# Patient Record
Sex: MALE | Race: WHITE | Employment: FULL TIME | URBAN - METROPOLITAN AREA
[De-identification: names, ages, dates, MRNs, and addresses within clinical notes are randomized per-mention and may not be internally consistent; named-entity substitution may affect disease eponyms.]

---

## 2021-01-05 NOTE — TELEPHONE ENCOUNTER
REFERRAL INFORMATION:    Referring Provider:  N/A    Referring Clinic:  N/A    Reason for Visit/Diagnosis: GERD, Abdominal Pain      FUTURE VISIT INFORMATION:    Appointment Date: 1/7/2021    Appointment Time: 2 PM      NOTES STATUS DETAILS   OFFICE NOTE from Referring Provider N/A    OFFICE NOTE from Other Specialist In process    HOSPITAL DISCHARGE SUMMARY/  ED VISITS N/A    OPERATIVE REPORT N/A    MEDICATION LIST N/A         ENDOSCOPY  In process    COLONOSCOPY In process    ERCP N/A    EUS N/A    STOOL TESTING In process    PERTINENT LABS In process    PATHOLOGY REPORTS (RELATED) In process    IMAGING (CT, MRI, EGD, MRCP, Small Bowel Follow Through/SBT, MR/CT Enterography) In process      1/5/2021 10:04am Called and LVM for Pt. Brendon     1/6/2021 3:20pm Called Pt and LVM; called to inquire about outside medical records.  CSS will notify MD. Olivas

## 2021-01-07 ENCOUNTER — VIRTUAL VISIT (OUTPATIENT)
Dept: GASTROENTEROLOGY | Facility: CLINIC | Age: 31
End: 2021-01-07
Payer: COMMERCIAL

## 2021-01-07 ENCOUNTER — PRE VISIT (OUTPATIENT)
Dept: GASTROENTEROLOGY | Facility: CLINIC | Age: 31
End: 2021-01-07

## 2021-01-07 VITALS — WEIGHT: 182 LBS | BODY MASS INDEX: 27.58 KG/M2 | HEIGHT: 68 IN

## 2021-01-07 DIAGNOSIS — K52.9 FREQUENT STOOLS: ICD-10-CM

## 2021-01-07 DIAGNOSIS — R14.0 BLOATING: ICD-10-CM

## 2021-01-07 DIAGNOSIS — R19.8 ABDOMINAL BURNING SENSATION IN LEFT UPPER QUADRANT: Primary | ICD-10-CM

## 2021-01-07 DIAGNOSIS — K62.5 RECTAL BLEEDING: ICD-10-CM

## 2021-01-07 DIAGNOSIS — R09.A2 GLOBUS SENSATION: ICD-10-CM

## 2021-01-07 PROCEDURE — 99204 OFFICE O/P NEW MOD 45 MIN: CPT | Mod: GT | Performed by: INTERNAL MEDICINE

## 2021-01-07 ASSESSMENT — PAIN SCALES - GENERAL: PAINLEVEL: NO PAIN (0)

## 2021-01-07 ASSESSMENT — MIFFLIN-ST. JEOR: SCORE: 1755.05

## 2021-01-07 NOTE — PATIENT INSTRUCTIONS
It was a pleasure taking care of you today.  I've included a brief summary of our discussion and care plan from today's visit below.  Please review this information with your primary care provider.  _______________________________________________________________________    My recommendations are summarized as follows:    1. Please schedule upper endoscopy with the placement of a 96hr Bravo capsule OFF PPI therapy.   2. At the same time we will perform colonoscopy to assess the rectal bleeding.  3. Depending on the findings we may consider PPI therapy.     To schedule endoscopic procedures you may call: [phone]  To schedule radiology tests you may call: [phone]  To schedule an ENT appointment you may call: [phone]    Please call our nurse Kacey with any questions or concerns- [phone].  --    Return to GI Clinic in 3 months to review your progress.    _______________________________________________________________________    Who do I call with any questions after my visit?  Please be in touch if there are any further questions that arise following today's visit.  There are multiple ways to contact your gastroenterology care team.        During business hours, you may reach a Gastroenterology nurse at [phone] and choose option 3.         To schedule or reschedule an appointment, please call [phone].       You can always send a secure message through VideoLens.  VideoLens messages are answered by your nurse or doctor typically within 24 hours.  Please allow extra time on weekends and holidays.        For urgent/emergent questions after business hours, you may reach the on-call GI Fellow by contacting the [facility] at [phone].     How will I get the results of any tests ordered?    You will receive all of your results.  If you have signed up for MyChart, any tests ordered at your visit will be available to you after your physician reviews them.  Typically this takes 1-2  weeks.  If there are urgent results that require a change in your care plan, your physician or nurse will call you to discuss the next steps.      What is Coupayhart?  theScore is a secure way for you to access all of your healthcare records from the [facility].  It is a web based computer program, so you can sign on to it from any location.  It also allows you to send secure messages to your care team.  I recommend signing up for theScore access if you have not already done so and are comfortable with using a computer.      How to I schedule a follow-up visit?  If you did not schedule a follow-up visit today, please call [phone] to schedule a follow-up office visit.        Sincerely,    Rubin Blakely DO   of Medicine  Division of Gastroenterology, Hepatology, and Nutrition  [facility]

## 2021-01-07 NOTE — NURSING NOTE
"Chief Complaint   Patient presents with     New Patient     GERD, abdominal pain       Vitals:    01/07/21 1330   Weight: 82.6 kg (182 lb)   Height: 1.727 m (5' 8\")       Body mass index is 27.67 kg/m .      Remigio Morin LPN                        "

## 2021-01-07 NOTE — LETTER
"    1/7/2021         RE: Bonilla Baker  [address] [place] [address]        Dear Colleague,    Thank you for referring your patient, Bonilla Baker, to the [facility] [place]. Please see a copy of my visit note below.    Bonilla is a 30 year old who is being evaluated via a billable video visit.      How would you like to obtain your AVS? MyChart  If the video visit is dropped, the invitation should be resent by: Text to cell phone: [phone]  Will anyone else be joining your video visit? No      Bonilla Baker is a 30 year old male who is being evaluated via a billable video visit.      The patient has been notified of following:     \"This video visit will be conducted via a call between you and your physician/provider. We have found that certain health care needs can be provided without the need for an in-person physical exam.  This service lets us provide the care you need with a video conversation.  If a prescription is necessary we can send it directly to your pharmacy.  If lab work is needed we can place an order for that and you can then stop by our lab to have the test done at a later time.    If during the course of the call the physician/provider feels a video visit is not appropriate, you will not be charged for this service.\"     Video-Visit Details  Type of service:  Video Visit    Video Start Time: 2:03 PM  Video End Time:  2:30 PM    Originating Location (pt. Location): Home    Distant Location (provider location):  [facility] [place]     Platform used: Gareth Blakely DO    Gastroenterology Visit for: Bonilla Baker 1990   MRN: [id number]     Reason for Visit:  chief complaint    Referred by: Self  / No address on file  No care team member to display    History of Present Illness:   Bonilla Baker is a 30 year old male with heart murmur who is presenting as a new patient in consultation at the " request as a self referral with a chief complaint of abdominal burning and rectal bleeding..  ---------------------------------------------------------------  Bonilla Baker states three years ago he began having symptoms of acid reflux. In the morning depending on what he would eat the night before he would feels, he had LUQ burning, esophageal burning, side pain, occasionally has blood in his stool. This has been more frequent over the past few months. He had a chronic symptom of globus, he stopped drinking coffee which improved globus.  Occasional symptoms of heartburn. The LUQ burning is the most predominant symptom.     Drinking water, pizza, red sauce cause worse symptoms.     Reports having an upper endoscopy with no significant finding. Has not been on protonix, aciphex, dexilant. Has been on PPIs for greater than 2 months at a time, taking them consistently both once daily and BID.     Patient reports being gassy and has 5-6 bowel movements a day. + bloating. Frequency of bowel movements is baseline for as long as he can remember. Stools are typically loose in the morning and by noon he has more solid stools.  Bloating improves with bowel movements and exercise.     Blood in stool is described as spotty. Toilet paper can be bloody but has noted on the stool as well. Blood described as red.     ---------------------------------------------------------------     Bonilla Baker denies dysphagia, odynophagia,nausea, vomiting, early satiety, abdominal distension, constipation, hematochezia, or melena. No unintentional weight loss.     Family history of colon cancer: none    No family history of IBD  Wt Readings from Last 5 Encounters:   01/07/21 82.6 kg (182 lb)        Esophageal Questionnaire(s)    BEDQ Questionnaire  No flowsheet data found.  No flowsheet data found.    Eckardt Questionnaire  No flowsheet data found.    Promis 10 Questionnaire  No flowsheet data found.        STUDIES & PROCEDURES:    EGD:    Date: reported as normal  Impression:  Pathology Report:    Colonoscopy:  Date:  Impression:    Pathology Report:     EndoFLIP directed at the UES or LES (8cm (EF-325) balloon length or 16cm (EF-322) balloon length):   Date:  8cm balloon  Balloon inflation Balloon pressure CSA (mm^2) DI (mm^2/mmHg) Dmin (mm) Compliance   20 (ladmark ID)        30        40        50           16cm balloon  Balloon inflation Balloon pressure CSA (mm^2) DI (mm^2/mmHg) Dmin (mm) Compliance   30 (ladmark ID)        40        50        60        70           High Resolution Manometry:  Date:  Impression:    PH/Impedance:  Date:  Impression:     Bravo:  48 or 96hr  Date:  Impression:    CT:  Date:  Impression:    Esophagram:  Date:  Impression:     Prior medical records were reviewed including, but not limited to, notes from referring providers, lab work, radiographic tests, and other diagnostic tests. Pertinent results were summarized above.     History     Past Medical History:   Diagnosis Date     Heart murmur        History reviewed. No pertinent surgical history.    Social History     Socioeconomic History     Marital status: Single     Spouse name: Not on file     Number of children: Not on file     Years of education: Not on file     Highest education level: Not on file   Occupational History     Not on file   Social Needs     Financial resource strain: Not on file     Food insecurity     Worry: Not on file     Inability: Not on file     Transportation needs     Medical: Not on file     Non-medical: Not on file   Tobacco Use     Smoking status: Never Smoker     Smokeless tobacco: Never Used   Substance and Sexual Activity     Alcohol use: Not on file     Drug use: Not on file     Sexual activity: Not on file   Lifestyle     Physical activity     Days per week: Not on file     Minutes per session: Not on file     Stress: Not on file   Relationships     Social connections     Talks on phone: Not on file     Gets together: Not on  "file     Attends L[employer]-day service: Not on file     Active member of club or organization: Not on file     Attends meetings of clubs or organizations: Not on file     Relationship status: Not on file     Intimate partner violence     Fear of current or ex partner: Not on file     Emotionally abused: Not on file     Physically abused: Not on file     Forced sexual activity: Not on file   Other Topics Concern     Parent/sibling w/ CABG, MI or angioplasty before 65F 55M? Not Asked   Social History Narrative     Not on file       Family History   Problem Relation Age of Onset     Crohn's Disease No family hx of      Ulcerative Colitis No family hx of      GERD No family hx of      Stomach Cancer No family hx of        Medications and Allergies:     No outpatient encounter medications on file as of 1/7/2021.     No facility-administered encounter medications on file as of 1/7/2021.         No Known Allergies     Review of systems:  A full 10 point review of systems was obtained and was negative except for the pertinent positives and negatives stated within the HPI.    Objective Findings:   Physical Exam:    Constitutional: Ht 1.727 m (5' 8\")   Wt 82.6 kg (182 lb)   BMI 27.67 kg/m    General: Alert, cooperative, no distress, well-appearing  Head: Atraumatic, normocephalic, no obvious abnormalities   Eyes: EOMI, Sclera anicteric, no obvious conjunctival hemorrhage   Nose: Nares normal, no obvious malformation, no obvious rhinorrhea   Throat: Uvula midline, no obvious tongue deviation, palate elevation equal bilaterally  Respiratory: normal appearing respirations  Musculoskeletal: Range of motion intact, no obvious strength deficit  Skin: No jaundice, no obvious rash  Neurologic: AAOx3, no obvious neurologic abnormality  Psychiatric: Normal Affect, appropriate mood  Extremities: No obvious edema, no obvious malformation     Labs, Radiology, Pathology     No results found for: WBC, HGB, PLT, CHOL, TRIG, HDL, ALT, AST, NA, " CREATININE, BUN, CO2, TSH, INR, GLUF     Liver Function Studies - No results for input(s): PROTTOTAL, ALBUMIN, BILITOTAL, ALKPHOS, AST, ALT, BILIDIRECT in the last 89277 hours.       Patient Active Problem List    Diagnosis Date Noted     Abdominal burning sensation in left upper quadrant 01/07/2021     Priority: Medium     Globus sensation 01/07/2021     Priority: Medium     Rectal bleeding 01/07/2021     Priority: Medium     Bloating 01/07/2021     Priority: Medium     Frequent stools 01/07/2021     Priority: Medium      Assessment and Plan   Assessment:    Bonilla Baker is a 30 year old male with heart murmur who is presenting as a new patient in consultation at the request as a self referral with a chief complaint of abdominal burning and rectal bleeding.    The patient was seen in telemedicine video consultation regarding his symptoms of abdominal burning, bloating, and rectal bleeding.     The patient's predominant symptom of abdominal burning may be secondary to acid reflux disease but this is not yet clear. Additionally, he has been on multiple PPIs for prolonged periods of time both as once daily and BID dosing with no change in symptoms. The fact that the patient's globus improved with cessation of caffeine does suggest some degree of reflux disease. The most prudent way to evaluate his symptoms would be to perform upper endoscopy and place a 96hr OFF therapy bravo. We discussed the risks and benefits of pursuing upper endoscopy. The benefits outweigh the potential risks however the risks, including but not limited to: bleeding, infection, perforation, the need for surgical intervention, hospitalization, and in rare instances death were discussed and all questions were answered. Lynn was also discussed including risks and benefits. The patient denies any nickel allergy.     Gastric and duodenal biopsies will be obtained at that time. The duodenal biopsies will serve to evaluate the frequent loose stools  and bloating.     The patient's rectal bleeding and loose stools will require a colonoscopy to evaluate. We discussed the risks and benefits of pursuing colonoscopy The benefits outweigh the potential risks however the risks, including but not limited to: bleeding, infection, perforation, the need for surgical intervention, hospitalization, and in rare instances death were discussed and all questions were answered.    1. Abdominal burning sensation in left upper quadrant    2. Bloating    3. Globus sensation    4. Frequent stools    5. Rectal bleeding       Orders Placed This Encounter   Procedures     GASTROENTEROLOGY ADULT REF PROCEDURE ONLY      Plan:  1. Please schedule upper endoscopy with the placement of a 96hr Bravo capsule OFF PPI therapy.   2. At the same time we will perform colonoscopy to assess the rectal bleeding.  3. Depending on the findings we may consider PPI therapy.     Follow up plan:   Return to clinic 3 months and as needed.    The risks and benefits of my recommendations, as well as other treatment options were discussed with the patient and any available family today. All questions were answered.     o Follow up: As planned above. Today, I personally spent 27 minutes in direct face to face time with the patient, of which greater than 50% of the time was spent in patient education and counseling as described above. Approximately 15 minutes were spent on indirect care associated with the patient's consultation including but not limited to review of: patient medical records to date, clinic visits, hospital records, lab results, imaging studies, procedural documentation, and coordinating care with other providers. The findings from this review are summarized in the above note.    The patient verbalized understanding of the plan and was appreciative for the time spent and information provided during the office visit.     Author:   Rubin Blakely DO   of Medicine  Division of  Gastroenterology, Hepatology, and Nutrition  [facility]     Documentation assisted by voice recognition and documentation system.          Again, thank you for allowing me to participate in the care of your patient.        Sincerely,        Rubin Blakely, DO

## 2021-01-07 NOTE — PROGRESS NOTES
"Bonilla is a 30 year old who is being evaluated via a billable video visit.      How would you like to obtain your AVS? MyChart  If the video visit is dropped, the invitation should be resent by: Text to cell phone: [phone]  Will anyone else be joining your video visit? No      Bonilla Baker is a 30 year old male who is being evaluated via a billable video visit.      The patient has been notified of following:     \"This video visit will be conducted via a call between you and your physician/provider. We have found that certain health care needs can be provided without the need for an in-person physical exam.  This service lets us provide the care you need with a video conversation.  If a prescription is necessary we can send it directly to your pharmacy.  If lab work is needed we can place an order for that and you can then stop by our lab to have the test done at a later time.    If during the course of the call the physician/provider feels a video visit is not appropriate, you will not be charged for this service.\"     Video-Visit Details  Type of service:  Video Visit    Video Start Time: 2:03 PM  Video End Time:  2:30 PM    Originating Location (pt. Location): Home    Distant Location (provider location):  [facility] [place]     Platform used: Gareth Blakely DO    Gastroenterology Visit for: Bonilla Baker 1990   MRN: [id number]     Reason for Visit:  chief complaint    Referred by: Self  / No address on file  No care team member to display    History of Present Illness:   Bonilla Baker is a 30 year old male with heart murmur who is presenting as a new patient in consultation at the request as a self referral with a chief complaint of abdominal burning and rectal bleeding..  ---------------------------------------------------------------  Bonilla Baker states three years ago he began having symptoms of acid reflux. In the morning depending on what he " would eat the night before he would feels, he had LUQ burning, esophageal burning, side pain, occasionally has blood in his stool. This has been more frequent over the past few months. He had a chronic symptom of globus, he stopped drinking coffee which improved globus.  Occasional symptoms of heartburn. The LUQ burning is the most predominant symptom.     Drinking water, pizza, red sauce cause worse symptoms.     Reports having an upper endoscopy with no significant finding. Has not been on protonix, aciphex, dexilant. Has been on PPIs for greater than 2 months at a time, taking them consistently both once daily and BID.     Patient reports being gassy and has 5-6 bowel movements a day. + bloating. Frequency of bowel movements is baseline for as long as he can remember. Stools are typically loose in the morning and by noon he has more solid stools.  Bloating improves with bowel movements and exercise.     Blood in stool is described as spotty. Toilet paper can be bloody but has noted on the stool as well. Blood described as red.     ---------------------------------------------------------------     Bonilla Baker denies dysphagia, odynophagia,nausea, vomiting, early satiety, abdominal distension, constipation, hematochezia, or melena. No unintentional weight loss.     Family history of colon cancer: none    No family history of IBD  Wt Readings from Last 5 Encounters:   01/07/21 82.6 kg (182 lb)        Esophageal Questionnaire(s)    BEDQ Questionnaire  No flowsheet data found.  No flowsheet data found.    Eckardt Questionnaire  No flowsheet data found.    Promis 10 Questionnaire  No flowsheet data found.        STUDIES & PROCEDURES:    EGD:   Date: reported as normal  Impression:  Pathology Report:    Colonoscopy:  Date:  Impression:    Pathology Report:     EndoFLIP directed at the UES or LES (8cm (EF-325) balloon length or 16cm (EF-322) balloon length):   Date:  8cm balloon  Balloon inflation Balloon pressure  CSA (mm^2) DI (mm^2/mmHg) Dmin (mm) Compliance   20 (ladmark ID)        30        40        50           16cm balloon  Balloon inflation Balloon pressure CSA (mm^2) DI (mm^2/mmHg) Dmin (mm) Compliance   30 (ladmark ID)        40        50        60        70           High Resolution Manometry:  Date:  Impression:    PH/Impedance:  Date:  Impression:     Bravo:  48 or 96hr  Date:  Impression:    CT:  Date:  Impression:    Esophagram:  Date:  Impression:     Prior medical records were reviewed including, but not limited to, notes from referring providers, lab work, radiographic tests, and other diagnostic tests. Pertinent results were summarized above.     History     Past Medical History:   Diagnosis Date     Heart murmur        History reviewed. No pertinent surgical history.    Social History     Socioeconomic History     Marital status: Single     Spouse name: Not on file     Number of children: Not on file     Years of education: Not on file     Highest education level: Not on file   Occupational History     Not on file   Social Needs     Financial resource strain: Not on file     Food insecurity     Worry: Not on file     Inability: Not on file     Transportation needs     Medical: Not on file     Non-medical: Not on file   Tobacco Use     Smoking status: Never Smoker     Smokeless tobacco: Never Used   Substance and Sexual Activity     Alcohol use: Not on file     Drug use: Not on file     Sexual activity: Not on file   Lifestyle     Physical activity     Days per week: Not on file     Minutes per session: Not on file     Stress: Not on file   Relationships     Social connections     Talks on phone: Not on file     Gets together: Not on file     Attends [employer] service: Not on file     Active member of club or organization: Not on file     Attends meetings of clubs or organizations: Not on file     Relationship status: Not on file     Intimate partner violence     Fear of current or ex partner: Not on file  "    Emotionally abused: Not on file     Physically abused: Not on file     Forced sexual activity: Not on file   Other Topics Concern     Parent/sibling w/ CABG, MI or angioplasty before 65F 55M? Not Asked   Social History Narrative     Not on file       Family History   Problem Relation Age of Onset     Crohn's Disease No family hx of      Ulcerative Colitis No family hx of      GERD No family hx of      Stomach Cancer No family hx of        Medications and Allergies:     No outpatient encounter medications on file as of 1/7/2021.     No facility-administered encounter medications on file as of 1/7/2021.         No Known Allergies     Review of systems:  A full 10 point review of systems was obtained and was negative except for the pertinent positives and negatives stated within the HPI.    Objective Findings:   Physical Exam:    Constitutional: Ht 1.727 m (5' 8\")   Wt 82.6 kg (182 lb)   BMI 27.67 kg/m    General: Alert, cooperative, no distress, well-appearing  Head: Atraumatic, normocephalic, no obvious abnormalities   Eyes: EOMI, Sclera anicteric, no obvious conjunctival hemorrhage   Nose: Nares normal, no obvious malformation, no obvious rhinorrhea   Throat: Uvula midline, no obvious tongue deviation, palate elevation equal bilaterally  Respiratory: normal appearing respirations  Musculoskeletal: Range of motion intact, no obvious strength deficit  Skin: No jaundice, no obvious rash  Neurologic: AAOx3, no obvious neurologic abnormality  Psychiatric: Normal Affect, appropriate mood  Extremities: No obvious edema, no obvious malformation     Labs, Radiology, Pathology     No results found for: WBC, HGB, PLT, CHOL, TRIG, HDL, ALT, AST, NA, CREATININE, BUN, CO2, TSH, INR, GLUF     Liver Function Studies - No results for input(s): PROTTOTAL, ALBUMIN, BILITOTAL, ALKPHOS, AST, ALT, BILIDIRECT in the last 03034 hours.       Patient Active Problem List    Diagnosis Date Noted     Abdominal burning sensation in left " upper quadrant 01/07/2021     Priority: Medium     Globus sensation 01/07/2021     Priority: Medium     Rectal bleeding 01/07/2021     Priority: Medium     Bloating 01/07/2021     Priority: Medium     Frequent stools 01/07/2021     Priority: Medium      Assessment and Plan   Assessment:    Bonilla Baker is a 30 year old male with heart murmur who is presenting as a new patient in consultation at the request as a self referral with a chief complaint of abdominal burning and rectal bleeding.    The patient was seen in telemedicine video consultation regarding his symptoms of abdominal burning, bloating, and rectal bleeding.     The patient's predominant symptom of abdominal burning may be secondary to acid reflux disease but this is not yet clear. Additionally, he has been on multiple PPIs for prolonged periods of time both as once daily and BID dosing with no change in symptoms. The fact that the patient's globus improved with cessation of caffeine does suggest some degree of reflux disease. The most prudent way to evaluate his symptoms would be to perform upper endoscopy and place a 96hr OFF therapy bravo. We discussed the risks and benefits of pursuing upper endoscopy. The benefits outweigh the potential risks however the risks, including but not limited to: bleeding, infection, perforation, the need for surgical intervention, hospitalization, and in rare instances death were discussed and all questions were answered. Lynn was also discussed including risks and benefits. The patient denies any nickel allergy.     Gastric and duodenal biopsies will be obtained at that time. The duodenal biopsies will serve to evaluate the frequent loose stools and bloating.     The patient's rectal bleeding and loose stools will require a colonoscopy to evaluate. We discussed the risks and benefits of pursuing colonoscopy The benefits outweigh the potential risks however the risks, including but not limited to: bleeding,  infection, perforation, the need for surgical intervention, hospitalization, and in rare instances death were discussed and all questions were answered.    1. Abdominal burning sensation in left upper quadrant    2. Bloating    3. Globus sensation    4. Frequent stools    5. Rectal bleeding       Orders Placed This Encounter   Procedures     GASTROENTEROLOGY ADULT REF PROCEDURE ONLY      Plan:  1. Please schedule upper endoscopy with the placement of a 96hr Bravo capsule OFF PPI therapy.   2. At the same time we will perform colonoscopy to assess the rectal bleeding.  3. Depending on the findings we may consider PPI therapy.     Follow up plan:   Return to clinic 3 months and as needed.    The risks and benefits of my recommendations, as well as other treatment options were discussed with the patient and any available family today. All questions were answered.     o Follow up: As planned above. Today, I personally spent 27 minutes in direct face to face time with the patient, of which greater than 50% of the time was spent in patient education and counseling as described above. Approximately 15 minutes were spent on indirect care associated with the patient's consultation including but not limited to review of: patient medical records to date, clinic visits, hospital records, lab results, imaging studies, procedural documentation, and coordinating care with other providers. The findings from this review are summarized in the above note.    The patient verbalized understanding of the plan and was appreciative for the time spent and information provided during the office visit.     Author:   Rubin Blakely DO   of Medicine  Division of Gastroenterology, Hepatology, and Nutrition  [facility]     Documentation assisted by voice recognition and documentation system.

## 2021-01-07 NOTE — LETTER
Date:March 14, 2021      Patient was self referred, no letter generated. Do not send.        [facility] Health Information

## 2021-01-09 ENCOUNTER — HEALTH MAINTENANCE LETTER (OUTPATIENT)
Age: 31
End: 2021-01-09

## 2021-01-11 ENCOUNTER — TELEPHONE (OUTPATIENT)
Dept: GASTROENTEROLOGY | Facility: CLINIC | Age: 31
End: 2021-01-11

## 2021-01-11 ENCOUNTER — HOSPITAL ENCOUNTER (OUTPATIENT)
Facility: CLINIC | Age: 31
End: 2021-01-11
Attending: INTERNAL MEDICINE | Admitting: INTERNAL MEDICINE
Payer: COMMERCIAL

## 2021-01-11 NOTE — TELEPHONE ENCOUNTER
Patient is scheduled for EGD with Bravo and colonoscopy with Dr. Blakely    Spoke with: Bonilla Baker    Date of Procedure: 2/23/2021    Location: Unit [facility]    Sedation Type MAC    Pre-op for Unit [facility] MAC yes    (if yes advise patient they will need a pre-op prior to procedure)      Is patient on blood thinners? -no (If yes- inform patient to follow up with PCP or provider for follow up instructions)     Informed patient they will need an adult  yes    Informed Patient of COVID Test Requirement yes and scheduled    Preferred Pharmacy for Pre Prescription on chart    Confirmed Nurse will call to complete assessment yes    Additional comments: no

## 2021-01-12 NOTE — TELEPHONE ENCOUNTER
FUTURE VISIT INFORMATION      SURGERY INFORMATION:    Date: 2/23/21    Location: [facility]    Surgeon:  Rubin Blakely DO    Anesthesia Type:  MAC    Procedure: ESOPHAGOGASTRODUODENOSCOPY, WITH BRAVO PH MONITORING DEVICE INSERTION    Consult: virtual visit 1/7/21    RECORDS REQUESTED FROM:       Pertinent Medical History: None

## 2021-01-26 ENCOUNTER — ANESTHESIA EVENT (OUTPATIENT)
Dept: SURGERY | Facility: CLINIC | Age: 31
End: 2021-01-26

## 2021-01-26 ENCOUNTER — VIRTUAL VISIT (OUTPATIENT)
Dept: SURGERY | Facility: CLINIC | Age: 31
End: 2021-01-26
Payer: COMMERCIAL

## 2021-01-26 ENCOUNTER — PRE VISIT (OUTPATIENT)
Dept: SURGERY | Facility: CLINIC | Age: 31
End: 2021-01-26

## 2021-01-26 DIAGNOSIS — Z01.818 PREOP EXAMINATION: Primary | ICD-10-CM

## 2021-01-26 DIAGNOSIS — K62.5 RECTAL BLEEDING: ICD-10-CM

## 2021-01-26 DIAGNOSIS — R09.A2 GLOBUS SENSATION: ICD-10-CM

## 2021-01-26 DIAGNOSIS — K52.9 FREQUENT STOOLS: ICD-10-CM

## 2021-01-26 DIAGNOSIS — K21.9 GASTROESOPHAGEAL REFLUX DISEASE WITHOUT ESOPHAGITIS: ICD-10-CM

## 2021-01-26 DIAGNOSIS — R19.8 ABDOMINAL BURNING SENSATION IN LEFT UPPER QUADRANT: ICD-10-CM

## 2021-01-26 PROCEDURE — 99203 OFFICE O/P NEW LOW 30 MIN: CPT | Mod: GT | Performed by: NURSE PRACTITIONER

## 2021-01-26 SDOH — ECONOMIC STABILITY: TRANSPORTATION INSECURITY
IN THE PAST 12 MONTHS, HAS LACK OF TRANSPORTATION KEPT YOU FROM MEETINGS, WORK, OR FROM GETTING THINGS NEEDED FOR DAILY LIVING?: NOT ASKED

## 2021-01-26 SDOH — ECONOMIC STABILITY: INCOME INSECURITY: HOW HARD IS IT FOR YOU TO PAY FOR THE VERY BASICS LIKE FOOD, HOUSING, MEDICAL CARE, AND HEATING?: NOT ASKED

## 2021-01-26 SDOH — HEALTH STABILITY: MENTAL HEALTH: HOW MANY STANDARD DRINKS CONTAINING ALCOHOL DO YOU HAVE ON A TYPICAL DAY?: NOT ASKED

## 2021-01-26 SDOH — ECONOMIC STABILITY: FOOD INSECURITY: WITHIN THE PAST 12 MONTHS, YOU WORRIED THAT YOUR FOOD WOULD RUN OUT BEFORE YOU GOT MONEY TO BUY MORE.: NOT ASKED

## 2021-01-26 SDOH — ECONOMIC STABILITY: TRANSPORTATION INSECURITY
IN THE PAST 12 MONTHS, HAS THE LACK OF TRANSPORTATION KEPT YOU FROM MEDICAL APPOINTMENTS OR FROM GETTING MEDICATIONS?: NOT ASKED

## 2021-01-26 SDOH — HEALTH STABILITY: MENTAL HEALTH: HOW OFTEN DO YOU HAVE 6 OR MORE DRINKS ON ONE OCCASION?: NOT ASKED

## 2021-01-26 SDOH — HEALTH STABILITY: MENTAL HEALTH: HOW OFTEN DO YOU HAVE A DRINK CONTAINING ALCOHOL?: NOT ASKED

## 2021-01-26 SDOH — ECONOMIC STABILITY: FOOD INSECURITY: WITHIN THE PAST 12 MONTHS, THE FOOD YOU BOUGHT JUST DIDN'T LAST AND YOU DIDN'T HAVE MONEY TO GET MORE.: NOT ASKED

## 2021-01-26 ASSESSMENT — LIFESTYLE VARIABLES: TOBACCO_USE: 0

## 2021-01-26 ASSESSMENT — PAIN SCALES - GENERAL: PAINLEVEL: MILD PAIN (2)

## 2021-01-26 NOTE — PROGRESS NOTES
Bonilla is a 30 year old who is being evaluated via a billable video visit.        AVS - Mychart  Will anyone else be joining your video visit? No    BAN Avendaño LPN

## 2021-01-26 NOTE — ANESTHESIA PREPROCEDURE EVALUATION
"Anesthesia Pre-Procedure Evaluation    Patient: Bonilla Baker   MRN:     [id number] Gender:   male   Age:    30 year old :      1990        Preoperative Diagnosis: * No surgery found *        LABS:  CBC: No results found for: WBC, HGB, HCT, PLT  BMP: No results found for: NA, POTASSIUM, CHLORIDE, CO2, BUN, CR, GLC  COAGS: No results found for: PTT, INR, FIBR  POC: No results found for: BGM, HCG, HCGS  OTHER: No results found for: PH, LACT, A1C, LUCÍA, PHOS, MAG, ALBUMIN, PROTTOTAL, ALT, AST, GGT, ALKPHOS, BILITOTAL, BILIDIRECT, LIPASE, AMYLASE, PATI, TSH, T4, T3, CRP, SED     Preop Vitals    BP Readings from Last 3 Encounters:   No data found for BP    Pulse Readings from Last 3 Encounters:   No data found for Pulse      Resp Readings from Last 3 Encounters:   No data found for Resp    SpO2 Readings from Last 3 Encounters:   No data found for SpO2      Temp Readings from Last 1 Encounters:   No data found for Temp    Ht Readings from Last 1 Encounters:   21 1.727 m (5' 8\")      Wt Readings from Last 1 Encounters:   21 82.6 kg (182 lb)    Estimated body mass index is 27.67 kg/m  as calculated from the following:    Height as of 21: 1.727 m (5' 8\").    Weight as of 21: 82.6 kg (182 lb).     LDA:        Past Medical History:   Diagnosis Date     Heart murmur       No past surgical history on file.   No Known Allergies     Anesthesia Evaluation     . Pt has had prior anesthetic. Type: General and MAC.    No history of anesthetic complications          ROS/MED HX    ENT/Pulmonary:    (-) tobacco use and recent URI   Neurologic:  - neg neurologic ROS     Cardiovascular:     (+) -----valvular problems/murmurs reports he has a mild heart murmur that has decreased with age.  . Previous cardiac testing   Echo: Date: unknown Results:    Stress Test: Date: Results:    ECG Reviewed: Date: Results:    Cath: Date: Results:        METS/Exercise Tolerance:  >4 METS   Hematologic:  - neg hematologic  ROS  "   (-) history of blood clots and History of Transfusion   Musculoskeletal:   (+) arthritis (right foot and possibly other joints), other musculoskeletal- multiple joint stiffness,       GI/Hepatic:     (+) GERD, Symptomatic, Other GI/Hepatic LUQ burning sensation, glubus sensation, bloating, frequent stools, rectal bleeding       Renal/Genitourinary:  - neg Renal ROS       Endo:         Psychiatric:  - neg psychiatric ROS    (-) chronic opioid use history   Infectious Disease:  - neg infectious disease ROS       Malignancy:       Other:    (+) , no H/O Chronic Pain,                   JZG FV AN PHYSICAL EXAM    JZG FV AN PLAN NO PONV RULE    [unfilled]  PAC Discussion and Assessment    ASA Classification: 1  ASC OK for Surgery: UUGI.  Anesthetic techniques and relevant risks discussed: MAC with GA as backup                  PAC Resident/NP Anesthesia Assessment: Bonilla Baker is a 30 year old male scheduled for ESOPHAGOGASTRODUODENOSCOPY, WITH BRAVO PH MONITORING DEVICE INSERTION and colonoscopy on 2/23/21 by Dr. Blakely in evaluation of globus sensation, LUQ burning sensation, GERD, bloating, frequent stools and rectal bleeding.  PAC referral for risk assessment and optimization for anesthesia with comorbid conditions of: none.      Pre-operative considerations:  1.  Cardiac:  Functional status- METS >4.  He works out regularly doing cardio and weight training.  He notes that he does have a mild heart murmur since childhood that has decreased with age.  He also reports that he had an echocardiogram in [place] about 2.5 years ago and it noted some slight valve abnormality, but he doesn't recall what it was.  Will attempt to obtain echocardiogram report.  Please check cardiology tab for scanned results.  Low risk surgery with 0.4% risk of major adverse cardiac event.   2.  Pulm: LINA risk: low.    3.  GI:  Risk of PONV score = 1.  If > 2, anti-emetic intervention recommended.  GERD is not currently well controlled.  He  is not currently on any GERD medications.    VTE risk: 0.5%    Virtual visit - Please refer to the physical examination documented by the anesthesiologist in the anesthesia record on the day of surgery    **Addendum - Patient gave verbal consent to obtain records from [facility] in [place].  No echocardiogram results found.  Patient unsure where the echocardiogram was done exactly. No other systems queried. **    Patient is optimized and is acceptable candidate for the proposed procedure.  No further diagnostic evaluation is needed.         Aminata Ritter DNP, RN, APRN                                          Aminata Ritter APRN CNP

## 2021-01-26 NOTE — H&P
Pre-Operative H & P         Video-Visit Details    Type of service:  Video Visit    Patient verbally consented to video service today: YES      Video Start Time: 0752  Video End Time (time video stopped): 0813    Originating Location (pt. Location): Home    Distant Location (provider location):  provider's home    Mode of Communication:  Video Conference via [employer]      CC:  Preoperative exam to assess for increased cardiopulmonary risk while undergoing surgery and anesthesia.    Date of Encounter: 1/26/2021  Primary Care Physician:  No primary care provider on file.  Associated diagnosis:  globus sensation, LUQ burning sensation, GERD, bloating, frequent stools and rectal bleeding      HPI  Bonilla Baker is a 30 year old male who presents for pre-operative H & P in preparation for at [facility].    Bonilla Baker is a 30 year old male with history of mild heart murmur since childhood that has multiple GI complaints as listed above.  He has tried nexium and prilosec before with no success.  He notes that he also had an upper endoscopy done at an outside facility about 1.5 years ago, but the findings were negative.  He isn't currently on any GI medications or receiving any other GI treatments.  He has consulted with Dr. Blakely and the above listed procedures have been recommended for further evaluation.      History is obtained from the patient and the medical record.     Past Medical History  Past Medical History:   Diagnosis Date     Heart murmur        Past Surgical History  Past Surgical History:   Procedure Laterality Date     FOOT SURGERY Right 2002     wisdom teeth         Hx of Blood transfusions/reactions: none     Hx of abnormal bleeding or anti-platelet use: none      Steroid use in the last year: none    Personal or FH with difficulty with Anesthesia:  none    Prior to Admission Medications  No current outpatient medications on file.       Allergies  No Known Allergies    Social History  Social History      Socioeconomic History     Marital status: Single     Spouse name: Not on file     Number of children: 0     Years of education: Not on file     Highest education level: Not on file   Occupational History     Occupation:    Social Needs     Financial resource strain: Not on file     Food insecurity     Worry: Not on file     Inability: Not on file     Transportation needs     Medical: Not on file     Non-medical: Not on file   Tobacco Use     Smoking status: Never Smoker     Smokeless tobacco: Never Used   Substance and Sexual Activity     Alcohol use: Yes     Alcohol/week: 10.0 standard drinks     Types: 10 Standard drinks or equivalent per week     Drug use: Never     Sexual activity: Not on file   Lifestyle     Physical activity     Days per week: Not on file     Minutes per session: Not on file     Stress: Not on file   Relationships     Social connections     Talks on phone: Not on file     Gets together: Not on file     Attends [employer] service: Not on file     Active member of club or organization: Not on file     Attends meetings of clubs or organizations: Not on file     Relationship status: Not on file     Intimate partner violence     Fear of current or ex partner: Not on file     Emotionally abused: Not on file     Physically abused: Not on file     Forced sexual activity: Not on file   Other Topics Concern     Parent/sibling w/ CABG, MI or angioplasty before 65F 55M? Not Asked   Social History Narrative     Not on file       Family History  Family History   Problem Relation Age of Onset     No Known Problems Mother      No Known Problems Father      Multiple Sclerosis Sister      Diabetes Maternal Grandmother      Chronic Obstructive Pulmonary Disease Maternal Grandfather      Other - See Comments Maternal Grandfather         blood clot in back after back surgery     No Known Problems Paternal Grandmother      No Known Problems Sister      No Known Problems Paternal Grandfather       Crohn's Disease No family hx of      Ulcerative Colitis No family hx of      GERD No family hx of      Stomach Cancer No family hx of      Anesthesia Reaction No family hx of      Deep Vein Thrombosis No family hx of                ROS/MED HX  The complete review of systems is negative other than noted in the HPI or here.   ENT/Pulmonary:    (-) tobacco use and recent URI   Neurologic:  - neg neurologic ROS     Cardiovascular:     (+) -----valvular problems/murmurs reports he has a mild heart murmur that has decreased with age.  . Previous cardiac testing   Echo: Date: unknown Results:    Stress Test: Date: Results:    ECG Reviewed: Date: Results:    Cath: Date: Results:        METS/Exercise Tolerance:  >4 METS   Hematologic:  - neg hematologic  ROS    (-) history of blood clots and History of Transfusion   Musculoskeletal:   (+) arthritis (right foot and possibly other joints), other musculoskeletal- multiple joint stiffness,       GI/Hepatic:     (+) GERD, Symptomatic, Other GI/Hepatic LUQ burning sensation, glubus sensation, bloating, frequent stools, rectal bleeding       Renal/Genitourinary:  - neg Renal ROS       Endo:         Psychiatric:  - neg psychiatric ROS    (-) chronic opioid use history   Infectious Disease:  - neg infectious disease ROS       Malignancy:       Other:    (+) , no H/O Chronic Pain,                                           0 lbs 0 oz  Data Unavailable   There is no height or weight on file to calculate BMI.       Physical Exam  Constitutional: Awake, alert, cooperative, no apparent distress, and appears stated age.  Neurologic: Awake, alert, oriented to name, place and time.   Neuropsychiatric: Calm, cooperative. Normal affect.     Labs: (personally reviewed) - none indicated    Cardiac echo: records request pending - see below      ASSESSMENT and PLAN  Bonilla Baker is a 30 year old male scheduled for ESOPHAGOGASTRODUODENOSCOPY, WITH BRAVO PH MONITORING DEVICE INSERTION and colonoscopy  on 2/23/21 by Dr. Blakely in evaluation of globus sensation, LUQ burning sensation, GERD, bloating, frequent stools and rectal bleeding.  PAC referral for risk assessment and optimization for anesthesia with comorbid conditions of: none.      Pre-operative considerations:  1.  Cardiac:  Functional status- METS >4.  He works out regularly doing cardio and weight training.  He notes that he does have a mild heart murmur since childhood that has decreased with age.  He also reports that he had an echocardiogram in [place] about 2.5 years ago and it noted some slight valve abnormality, but he doesn't recall what it was.  Will attempt to obtain echocardiogram report.  Please check cardiology tab for scanned results.  Low risk surgery with 0.4% risk of major adverse cardiac event.   2.  Pulm: LINA risk: low.    3.  GI:  Risk of PONV score = 1.  If > 2, anti-emetic intervention recommended.  GERD is not currently well controlled.  He is not currently on any GERD medications.    VTE risk: 0.5%    Virtual visit - Please refer to the physical examination documented by the anesthesiologist in the anesthesia record on the day of surgery    **Addendum - Patient gave verbal consent to obtain records from [facility] in [place].  No echocardiogram results found.  Patient unsure where the echocardiogram was done exactly. No other systems queried. **    Patient is optimized and is acceptable candidate for the proposed procedure.  No further diagnostic evaluation is needed.       Visit time: 21 minutes  Documentation time: 10 minutes  --------------------------------------------  Total time spent on DOS = 31 minutes        Aminata Ritter DNP, RN, APRN  Preoperative Assessment Center  [facility]  Phone: [phone]  Fax: [phone]

## 2021-02-07 DIAGNOSIS — Z11.59 ENCOUNTER FOR SCREENING FOR OTHER VIRAL DISEASES: Primary | ICD-10-CM

## 2021-02-15 ENCOUNTER — TELEPHONE (OUTPATIENT)
Dept: GASTROENTEROLOGY | Facility: OUTPATIENT CENTER | Age: 31
End: 2021-02-15

## 2021-02-15 NOTE — TELEPHONE ENCOUNTER
Caller :Patient    Reason for cancel? Cx 2/23 due to schedule conflict/vacation    Rescheduled? Y- 3/23 Reddy

## 2021-03-09 ENCOUNTER — TELEPHONE (OUTPATIENT)
Dept: GASTROENTEROLOGY | Facility: CLINIC | Age: 31
End: 2021-03-09

## 2021-03-09 NOTE — TELEPHONE ENCOUNTER
Patient called to cancel Colonoscopy & EGD Bravo procedure with Dr. Blakely at [facility] under [facility].  Stated he will call back to reschedule if/when ready.    Procedure: Lower Endoscopy w blakely w mac    Upper Endoscopy     Esophageal Workup    Upper Endoscopy Type: EGD    Upper Endoscopy Sedation: Deep/MAC    Upper Endoscopy Reason for Procedure: abdominal burning    Esophageal Workup: EGD with Bravo    Esophageal Workup Reason for Referral: abdominal burning OFF therapy    Lower Endoscopy Type: Colonoscopy    Purpose of Colonoscopy Procedure: Diagnostic    Colonoscopy reason for referral: rectal bleeding, frequent stools w blakely w mac   Colonoscopy Sedation: Deep/MAC    Preferred Location: [facility]    Scheduling Instructions: If you have not heard from the scheduling office within 2 business days, please call [phone].           Associated Diagnoses    Abdominal burning sensation in left upper quadrant [R19.8]  - Primary       Bloating [R14.0]       Globus sensation [R09.89]       Frequent stools [K52.9]       Rectal bleeding [K62.5]

## 2021-10-11 ENCOUNTER — HEALTH MAINTENANCE LETTER (OUTPATIENT)
Age: 31
End: 2021-10-11

## 2022-01-30 ENCOUNTER — HEALTH MAINTENANCE LETTER (OUTPATIENT)
Age: 32
End: 2022-01-30

## 2022-09-24 ENCOUNTER — HEALTH MAINTENANCE LETTER (OUTPATIENT)
Age: 32
End: 2022-09-24

## 2023-05-08 ENCOUNTER — HEALTH MAINTENANCE LETTER (OUTPATIENT)
Age: 33
End: 2023-05-08